# Patient Record
Sex: FEMALE | Race: WHITE | NOT HISPANIC OR LATINO
[De-identification: names, ages, dates, MRNs, and addresses within clinical notes are randomized per-mention and may not be internally consistent; named-entity substitution may affect disease eponyms.]

---

## 2018-12-19 ENCOUNTER — APPOINTMENT (OUTPATIENT)
Dept: OTOLARYNGOLOGY | Facility: CLINIC | Age: 48
End: 2018-12-19
Payer: COMMERCIAL

## 2018-12-19 VITALS
OXYGEN SATURATION: 98 % | WEIGHT: 108 LBS | DIASTOLIC BLOOD PRESSURE: 67 MMHG | BODY MASS INDEX: 19.88 KG/M2 | HEART RATE: 75 BPM | SYSTOLIC BLOOD PRESSURE: 102 MMHG | HEIGHT: 62 IN

## 2018-12-19 PROBLEM — Z00.00 ENCOUNTER FOR PREVENTIVE HEALTH EXAMINATION: Status: ACTIVE | Noted: 2018-12-19

## 2018-12-19 PROCEDURE — 99203 OFFICE O/P NEW LOW 30 MIN: CPT | Mod: 25

## 2018-12-20 ENCOUNTER — FORM ENCOUNTER (OUTPATIENT)
Age: 48
End: 2018-12-20

## 2018-12-21 ENCOUNTER — APPOINTMENT (OUTPATIENT)
Dept: MRI IMAGING | Facility: HOSPITAL | Age: 48
End: 2018-12-21
Payer: COMMERCIAL

## 2018-12-21 ENCOUNTER — OUTPATIENT (OUTPATIENT)
Dept: OUTPATIENT SERVICES | Facility: HOSPITAL | Age: 48
LOS: 1 days | End: 2018-12-21
Payer: COMMERCIAL

## 2018-12-21 PROCEDURE — A9585: CPT

## 2018-12-21 PROCEDURE — 70543 MRI ORBT/FAC/NCK W/O &W/DYE: CPT

## 2018-12-21 PROCEDURE — 70543 MRI ORBT/FAC/NCK W/O &W/DYE: CPT | Mod: 26

## 2019-01-21 NOTE — ASU PATIENT PROFILE, ADULT - TEACHING/LEARNING LEARNING PREFERENCES
individual instruction/verbal instruction written material/verbal instruction/individual instruction

## 2019-01-21 NOTE — ASU PATIENT PROFILE, ADULT - PSH
delivery delivered    H/O hernia repair    H/O rhinoplasty  delivery delivered  x 3  H/O cosmetic plastic surgery  eyelids  H/O hernia repair    H/O rhinoplasty

## 2019-01-22 ENCOUNTER — APPOINTMENT (OUTPATIENT)
Dept: OTOLARYNGOLOGY | Facility: HOSPITAL | Age: 49
End: 2019-01-22

## 2019-01-22 ENCOUNTER — OUTPATIENT (OUTPATIENT)
Dept: OUTPATIENT SERVICES | Facility: HOSPITAL | Age: 49
LOS: 1 days | Discharge: HOME | End: 2019-01-22
Payer: COMMERCIAL

## 2019-01-22 ENCOUNTER — RESULT REVIEW (OUTPATIENT)
Age: 49
End: 2019-01-22

## 2019-01-22 VITALS
SYSTOLIC BLOOD PRESSURE: 110 MMHG | RESPIRATION RATE: 12 BRPM | OXYGEN SATURATION: 100 % | HEART RATE: 64 BPM | DIASTOLIC BLOOD PRESSURE: 72 MMHG

## 2019-01-22 VITALS
DIASTOLIC BLOOD PRESSURE: 58 MMHG | RESPIRATION RATE: 18 BRPM | TEMPERATURE: 98 F | WEIGHT: 110.01 LBS | HEIGHT: 62 IN | SYSTOLIC BLOOD PRESSURE: 97 MMHG | OXYGEN SATURATION: 100 % | HEART RATE: 69 BPM

## 2019-01-22 DIAGNOSIS — Z98.890 OTHER SPECIFIED POSTPROCEDURAL STATES: Chronic | ICD-10-CM

## 2019-01-22 PROCEDURE — 88304 TISSUE EXAM BY PATHOLOGIST: CPT

## 2019-01-22 PROCEDURE — 60280 REMOVE THYROID DUCT LESION: CPT

## 2019-01-22 PROCEDURE — 88311 DECALCIFY TISSUE: CPT

## 2019-01-22 PROCEDURE — C9399: CPT

## 2019-01-22 RX ORDER — ONDANSETRON 8 MG/1
4 TABLET, FILM COATED ORAL ONCE
Qty: 0 | Refills: 0 | Status: COMPLETED | OUTPATIENT
Start: 2019-01-22 | End: 2019-01-22

## 2019-01-22 RX ORDER — MORPHINE SULFATE 50 MG/1
4 CAPSULE, EXTENDED RELEASE ORAL ONCE
Qty: 0 | Refills: 0 | Status: DISCONTINUED | OUTPATIENT
Start: 2019-01-22 | End: 2019-01-22

## 2019-01-22 RX ORDER — OXYCODONE AND ACETAMINOPHEN 5; 325 MG/1; MG/1
1 TABLET ORAL ONCE
Qty: 0 | Refills: 0 | Status: DISCONTINUED | OUTPATIENT
Start: 2019-01-22 | End: 2019-01-22

## 2019-01-22 RX ADMIN — ONDANSETRON 4 MILLIGRAM(S): 8 TABLET, FILM COATED ORAL at 14:43

## 2019-01-22 RX ADMIN — MORPHINE SULFATE 4 MILLIGRAM(S): 50 CAPSULE, EXTENDED RELEASE ORAL at 14:21

## 2019-01-22 NOTE — BRIEF OPERATIVE NOTE - PROCEDURE
<<-----Click on this checkbox to enter Procedure Thyroglossal duct cyst excision using Sistrunk procedure  01/22/2019    Active  [id number]

## 2019-01-28 LAB — SURGICAL PATHOLOGY STUDY: SIGNIFICANT CHANGE UP

## 2019-01-31 ENCOUNTER — MOBILE ON CALL (OUTPATIENT)
Age: 49
End: 2019-01-31

## 2019-01-31 ENCOUNTER — OTHER (OUTPATIENT)
Age: 49
End: 2019-01-31

## 2019-02-04 NOTE — ASSESSMENT
[FreeTextEntry1] : POW #1 with well healing surgical site with supraincisional soft itssue edema, no sign of infection, well appearing subcuticular closure\par -path result reviewed with patient\par -offered for her to return in 1-2 weeks if the exposed knot bothers her and I will remove it\par -otherwise f/u in 2-3 months for interval exam and wound check\par -she has my email and will call or email with any issues\par \par I personally discussed this patient with the Fellow at the time of the visit. I agree with the assessment and plan as written, unless noted below. \par

## 2019-02-04 NOTE — REASON FOR VISIT
[de-identified] : Excision of thyroglossal duct cyst [de-identified] : 1 [de-identified] : Pt reports having a few days of odynophagia, modified consistency diet which she expected, now doing better each day, overally feels well, reports that suture is coming out of incision in the end of the closure, unhappy with that.  aBle to continue to advance diet, no issues with medications prescribed\par \par PE: there is soft tissue edema superior to incision without fluctuance, no erythema no mass palpated, the incision line has well everted edges, the buried knot on the right end of the incision is exposed but the remainder appears in the subcuticular layer; voice is strong, at baseline

## 2019-02-07 ENCOUNTER — APPOINTMENT (OUTPATIENT)
Dept: OTOLARYNGOLOGY | Facility: CLINIC | Age: 49
End: 2019-02-07
Payer: COMMERCIAL

## 2019-02-07 PROBLEM — Q89.2 CONGENITAL MALFORMATIONS OF OTHER ENDOCRINE GLANDS: Chronic | Status: ACTIVE | Noted: 2019-01-21

## 2019-02-07 PROCEDURE — 99024 POSTOP FOLLOW-UP VISIT: CPT

## 2019-02-07 NOTE — PHYSICAL EXAM
[Normal] : normal appearance, well groomed, well nourished, and in no acute distress [de-identified] : well-healing surgical site with supraincisional soft tissue edema [de-identified] : no pus, erythema or edema

## 2019-02-07 NOTE — ASSESSMENT
[FreeTextEntry1] : 48F s/p excision of thyroglossal duct cyst on 1/22/19 with a well healing surgical site with supraincisional soft tissue edema, no sign of infection.\par \par - Advised her the surgical site is healing very well and the appearance will get better over the next several months.\par - Advised her that is normal to have the strange sensation in her throat because the hyoid bone was removed during surgery.\par - Advised her to apply Vit E several times a day and use ScarAway strips.\par - F/U in 3 months.\par - RTC should any worrisome symptoms develop.\par - Pt verbalized understanding of above plan.

## 2019-02-07 NOTE — REASON FOR VISIT
[Post-Operative Visit] : a post-operative visit [FreeTextEntry2] : s/p excision of thyroglossal duct cyst

## 2019-04-30 ENCOUNTER — APPOINTMENT (OUTPATIENT)
Dept: ORTHOPEDIC SURGERY | Facility: CLINIC | Age: 49
End: 2019-04-30
Payer: OTHER MISCELLANEOUS

## 2019-04-30 DIAGNOSIS — S80.00XA CONTUSION OF UNSPECIFIED KNEE, INITIAL ENCOUNTER: ICD-10-CM

## 2019-04-30 PROCEDURE — 73562 X-RAY EXAM OF KNEE 3: CPT | Mod: 50

## 2019-04-30 PROCEDURE — 99203 OFFICE O/P NEW LOW 30 MIN: CPT

## 2019-04-30 NOTE — HISTORY OF PRESENT ILLNESS
[___ wks] : [unfilled] week(s) ago [de-identified] : Patient states she is coming with complaints of bilateral anterior knee pain status post a fall directly onto her knees. She states she was on her way to work [employer] and elevator which became stuck with the dorsum she can now she tripped landing anteriorly onto her knees she states there was bruising which has essentially resolved but still has discomfort anteriorly with mild clicking of the knee.

## 2019-04-30 NOTE — ASSESSMENT
[FreeTextEntry1] : Discussed at length with patient exam and history. Home exercises given and patient should follow up in 5-6 weeks if persistent discomfort. Patient offered anti-inflammatories and physical therapy but she declines at this time\par

## 2019-04-30 NOTE — PHYSICAL EXAM
[de-identified] : Bilateral knee\par \par Constitutional: \par The patient is healthy-appearing and in no apparent distress. \par \par Gait:\par The patient ambulates with a normal gait and no limp.\par \par Cardiovascular System: \par There is capillary refill less than 2 seconds. \par \par \par Skin: \par There is no skin abnormalities.\par \par Bilateral Knee: \par Bony Palpation: \par There is no tenderness of the medial or lateral joint line, the medial of lateral femoral chondyle, tibial tubercle, superior or inferior patella.\par There is tenderness to the medial and lateral patellar facets.\par \par Soft Tissue Palpation: \par There is no tenderness of the medial and lateral retinaculum, quadriceps tendon, patella tendon, ITB or pes anserine.\par \par Active Range of Motion: \par There is full range of motion at the knee actively and passively. \par \par Special Tests: \par There is a negative Apley and Steinmanns.  There is a negative Lachman, Anterior Drawer, and Posterior Drawer.  There is no varus or valgus laxity.\par \par Strength: \par There is 4/5 hip flexion and 5/5 knee flexion and extension.  \par \par Psychiatric: \par The patient demonstrates a normal mood and affect and is active and alert\par Alignment:\par There is external tibial rotation and femoral anteversion [de-identified] : X-ray bilateral knees. There is no significant bony abnormality arthritis or fracture.

## 2019-05-13 ENCOUNTER — APPOINTMENT (OUTPATIENT)
Dept: OTOLARYNGOLOGY | Facility: CLINIC | Age: 49
End: 2019-05-13
Payer: COMMERCIAL

## 2019-05-13 VITALS
DIASTOLIC BLOOD PRESSURE: 70 MMHG | HEART RATE: 60 BPM | OXYGEN SATURATION: 98 % | BODY MASS INDEX: 19.69 KG/M2 | HEIGHT: 62 IN | SYSTOLIC BLOOD PRESSURE: 110 MMHG | WEIGHT: 107 LBS

## 2019-05-13 DIAGNOSIS — Q89.2 CONGENITAL MALFORMATIONS OF OTHER ENDOCRINE GLANDS: ICD-10-CM

## 2019-05-13 PROCEDURE — 99213 OFFICE O/P EST LOW 20 MIN: CPT

## 2019-05-15 PROBLEM — Q89.2 THYROGLOSSAL CYST: Status: ACTIVE | Noted: 2018-12-19

## 2019-05-15 NOTE — ASSESSMENT
[FreeTextEntry1] : 49F s/p excision of thyroglossal duct cyst on 1/22/19, well-healed, doing well, no complaints.\par \par F/u prn.\par RTC should any worrisome symptoms persist.

## 2019-05-15 NOTE — REASON FOR VISIT
[Subsequent Evaluation] : a subsequent evaluation for [FreeTextEntry2] : surveillance s/p excision of thyroglossal duct cyst

## 2019-05-15 NOTE — PHYSICAL EXAM
[Normal] : orientation to person, place, and time: normal [de-identified] : well-healed anterior neck scar, no edema, erythema, or discharge

## 2019-05-15 NOTE — HISTORY OF PRESENT ILLNESS
[de-identified] : 49F returns for follow-up s/p excision of thyroglossal duct cyst on 1/22/19.  Pt is doing well, no complaints.  Previous strange sensation in her throat has resolved.

## 2020-02-05 NOTE — ASU PATIENT PROFILE, ADULT - NSALCOHOLFREQ_GEN_A_CORE_SD
"1525 Lab to call and request more blood for running adrenocortocotropin test. Text page sent to Ira Salguero NP re order status. Ira Salguero NP to call back at 1534 and request attempts be made to draw off iv line and NP to report she would \"place new order for lab\". Blood obtained and sent on ice in purple tube per lab request.   1545 Lab to call and report that cortisol sample sent from procedure room was hemolyzed. Text page sent to Ira Salguero NP. Ira Salguero NP to return page and made aware of hemolyzed cortisol lab. Ira Salguero NP to report that cortisol would be f/u with at a different time. Ira Salguero NP to request a urine sample at this time. Ira Salguero NP to place order so it would be visible electronically.   1550 Ilene Valero MD anes to bedside and ok to send pt home, sign out given. Discussions and updates given to family. Mother ok with placing urine collection bag and instructions given regarding placement and collection into canister.   During entire pacu stay  Yanni was present to facilitate communication with mother and also  advocate was present during entire pacu stay.  Discharge instructions given and Jackm to interpret and facilitate questions and answers.   1700 Unable to obtain urine in collection bag at time of completion of discharge instructions, Ira Salguero NP text paged to notify. Ira Salguero NP to call back and request IV be left in and patient to be brought up to 9th floor where they will wait for urine, obtain tube feedings, and remove IV. Mother ok with plan to discharge from PACU to stop by clinic on 9th floor. Pt and mother and  and community support leader escorted to meet Ira Salguero NP on 9th floor.   "
2-4 times/mo

## 2020-03-09 ENCOUNTER — APPOINTMENT (OUTPATIENT)
Dept: ORTHOPEDIC SURGERY | Facility: CLINIC | Age: 50
End: 2020-03-09
Payer: OTHER MISCELLANEOUS

## 2020-03-09 DIAGNOSIS — M94.269 CHONDROMALACIA, UNSPECIFIED KNEE: ICD-10-CM

## 2020-03-09 PROCEDURE — 99213 OFFICE O/P EST LOW 20 MIN: CPT

## 2020-03-09 PROCEDURE — 73562 X-RAY EXAM OF KNEE 3: CPT | Mod: 50

## 2020-03-09 NOTE — PHYSICAL EXAM
[de-identified] : Bilateral knee\par \par Constitutional: \par The patient is healthy-appearing and in no apparent distress. \par \par Gait:\par The patient ambulates with a normal gait and no limp.\par \par Cardiovascular System: \par There is capillary refill less than 2 seconds. \par \par \par Skin: \par There is no skin abnormalities.\par \par Bilateral Knee: \par Bony Palpation: \par There is no tenderness of the medial or lateral joint line, the medial of lateral femoral chondyle, tibial tubercle, superior or inferior patella.\par There is tenderness to the medial and lateral patellar facets.\par \par Soft Tissue Palpation: \par There is no tenderness of the medial and lateral retinaculum, quadriceps tendon, patella tendon, ITB or pes anserine.\par \par Active Range of Motion: \par There is full range of motion at the knee actively and passively. \par \par Special Tests: \par There is a negative Apley and Steinmanns.  There is a negative Lachman, Anterior Drawer, and Posterior Drawer.  There is no varus or valgus laxity.\par \par Strength: \par There is 4/5 hip flexion and 5/5 knee flexion and extension.  \par \par Psychiatric: \par The patient demonstrates a normal mood and affect and is active and alert\par Alignment:\par There is external tibial rotation and femoral anteversion [de-identified] : X-ray bilateral knees. There is no significant bony abnormality arthritis or fracture.

## 2020-03-09 NOTE — HISTORY OF PRESENT ILLNESS
[de-identified] : Patient was seen 10 months ago for bilateral knee chondromalacia.  States had improved with home exercises in past- not currently doing any at this time.

## 2022-05-13 NOTE — ASSESSMENT
[FreeTextEntry1] : Discussed at length with patient exam and history. Home exercises given and patient should follow up in 5-6 weeks if persistent discomfort. Patient offered anti-inflammatories and physical therapy but she declines at this time\par 
MEDICATIONS  (STANDING):  chlorhexidine 4% Liquid 1 Application(s) Topical <User Schedule>  dextrose 5% + sodium chloride 0.9%. 1000 milliLiter(s) (100 mL/Hr) IV Continuous <Continuous>  FLUoxetine 60 milliGRAM(s) Oral daily  folic acid 1 milliGRAM(s) Oral daily  gabapentin 600 milliGRAM(s) Oral at bedtime  LORazepam   Injectable   IV Push   LORazepam   Injectable 4 milliGRAM(s) IV Push every 4 hours  pantoprazole    Tablet 40 milliGRAM(s) Oral before breakfast  thiamine 100 milliGRAM(s) Oral daily    MEDICATIONS  (PRN):  LORazepam   Injectable 1 milliGRAM(s) IV Push every 1 hour PRN CIWA-Ar score 8 or greater  traZODone 50 milliGRAM(s) Oral at bedtime PRN SLEEP/ INSOMNIA